# Patient Record
Sex: FEMALE | URBAN - METROPOLITAN AREA
[De-identification: names, ages, dates, MRNs, and addresses within clinical notes are randomized per-mention and may not be internally consistent; named-entity substitution may affect disease eponyms.]

---

## 2019-10-15 ENCOUNTER — APPOINTMENT (OUTPATIENT)
Age: 54
Setting detail: DERMATOLOGY
End: 2019-10-15

## 2019-10-15 DIAGNOSIS — Z41.9 ENCOUNTER FOR PROCEDURE FOR PURPOSES OTHER THAN REMEDYING HEALTH STATE, UNSPECIFIED: ICD-10-CM

## 2019-10-15 PROCEDURE — OTHER RECOMMENDATIONS: OTHER

## 2019-10-15 NOTE — PROCEDURE: RECOMMENDATIONS
Recommendation Preamble: NEW Patient - Cosmetic Consult\\n\\nPrevious Cosmetic Treatment: none. \\n\\nGoals: \\n1) acne scarring - depressed with slight pinkness \\n\\nAssessed the patient's face with the patient while holding a hand held mirror.  \\nDiscussed the anatomy and anatomical changes of the skin associated with and due to the chronological aging process.\\n\\nPre-existing considerations to note: Skin Type V
Detail Level: Zone

## 2019-12-13 ENCOUNTER — APPOINTMENT (OUTPATIENT)
Age: 54
Setting detail: DERMATOLOGY
End: 2019-12-13